# Patient Record
Sex: MALE | Race: WHITE | NOT HISPANIC OR LATINO | Employment: FULL TIME | URBAN - METROPOLITAN AREA
[De-identification: names, ages, dates, MRNs, and addresses within clinical notes are randomized per-mention and may not be internally consistent; named-entity substitution may affect disease eponyms.]

---

## 2020-10-29 ENCOUNTER — OFFICE VISIT (OUTPATIENT)
Dept: CARDIAC SURGERY | Facility: CLINIC | Age: 55
End: 2020-10-29

## 2020-10-29 VITALS
DIASTOLIC BLOOD PRESSURE: 88 MMHG | OXYGEN SATURATION: 99 % | WEIGHT: 277.2 LBS | BODY MASS INDEX: 36.74 KG/M2 | SYSTOLIC BLOOD PRESSURE: 138 MMHG | TEMPERATURE: 96.8 F | HEART RATE: 79 BPM | HEIGHT: 73 IN

## 2020-10-29 DIAGNOSIS — I73.9 PVD (PERIPHERAL VASCULAR DISEASE) WITH CLAUDICATION (HCC): Primary | ICD-10-CM

## 2020-10-29 DIAGNOSIS — L40.50 PSORIATIC ARTHRITIS (HCC): ICD-10-CM

## 2020-10-29 DIAGNOSIS — I87.2 VENOUS STASIS DERMATITIS OF RIGHT LOWER EXTREMITY: Primary | ICD-10-CM

## 2020-10-29 PROBLEM — L40.9 PSORIASIS: Status: ACTIVE | Noted: 2020-10-29

## 2020-10-29 PROCEDURE — 93923 UPR/LXTR ART STDY 3+ LVLS: CPT | Performed by: STUDENT IN AN ORGANIZED HEALTH CARE EDUCATION/TRAINING PROGRAM

## 2020-10-29 PROCEDURE — 99204 OFFICE O/P NEW MOD 45 MIN: CPT | Performed by: STUDENT IN AN ORGANIZED HEALTH CARE EDUCATION/TRAINING PROGRAM

## 2020-10-29 RX ORDER — ASPIRIN 81 MG/1
81 TABLET, CHEWABLE ORAL DAILY
COMMUNITY

## 2020-10-29 RX ORDER — APREMILAST 30 MG/1
30 TABLET, FILM COATED ORAL DAILY
COMMUNITY
Start: 2020-10-08

## 2020-10-29 RX ORDER — HYDROCHLOROTHIAZIDE 25 MG/1
25 TABLET ORAL DAILY
COMMUNITY
Start: 2020-09-05

## 2020-10-29 RX ORDER — DILTIAZEM HYDROCHLORIDE 120 MG/1
120 CAPSULE, EXTENDED RELEASE ORAL DAILY
COMMUNITY
Start: 2020-09-05

## 2021-01-08 ENCOUNTER — TELEPHONE (OUTPATIENT)
Dept: CARDIAC SURGERY | Facility: CLINIC | Age: 56
End: 2021-01-08

## 2025-03-24 ENCOUNTER — OFFICE VISIT (OUTPATIENT)
Dept: CARDIOLOGY | Facility: CLINIC | Age: 60
End: 2025-03-24
Payer: COMMERCIAL

## 2025-03-24 VITALS
WEIGHT: 296.8 LBS | HEART RATE: 59 BPM | OXYGEN SATURATION: 92 % | SYSTOLIC BLOOD PRESSURE: 107 MMHG | BODY MASS INDEX: 39.34 KG/M2 | HEIGHT: 73 IN | DIASTOLIC BLOOD PRESSURE: 62 MMHG

## 2025-03-24 DIAGNOSIS — I87.2 VENOUS STASIS DERMATITIS OF RIGHT LOWER EXTREMITY: ICD-10-CM

## 2025-03-24 DIAGNOSIS — M79.89 LEG SWELLING: ICD-10-CM

## 2025-03-24 DIAGNOSIS — I10 PRIMARY HYPERTENSION: Primary | ICD-10-CM

## 2025-03-24 PROCEDURE — 99204 OFFICE O/P NEW MOD 45 MIN: CPT | Performed by: NURSE PRACTITIONER

## 2025-03-24 RX ORDER — METOPROLOL TARTRATE 25 MG/1
25 TABLET, FILM COATED ORAL 2 TIMES DAILY
COMMUNITY

## 2025-03-24 RX ORDER — FLUTICASONE PROPIONATE 50 UG/1
2 POWDER, METERED RESPIRATORY (INHALATION) DAILY
COMMUNITY

## 2025-03-24 RX ORDER — DILTIAZEM HYDROCHLORIDE 240 MG/1
240 CAPSULE, EXTENDED RELEASE ORAL DAILY
COMMUNITY

## 2025-03-24 RX ORDER — CYCLOBENZAPRINE HCL 10 MG
10 TABLET ORAL 3 TIMES DAILY PRN
COMMUNITY

## 2025-03-24 RX ORDER — EPINEPHRINE 0.3 MG/.3ML
0.3 INJECTION SUBCUTANEOUS
COMMUNITY

## 2025-03-24 RX ORDER — TAMSULOSIN HYDROCHLORIDE 0.4 MG/1
1 CAPSULE ORAL DAILY
COMMUNITY

## 2025-03-24 RX ORDER — BUDESONIDE AND FORMOTEROL FUMARATE DIHYDRATE 160; 4.5 UG/1; UG/1
2 AEROSOL RESPIRATORY (INHALATION)
COMMUNITY

## 2025-03-24 NOTE — PROGRESS NOTES
Subjective     Manohar Nesbitt is a 59 y.o. male who presents to day for Hypertension (Blood pressure has been running high . ) and Establish Care (Patient did not bring a medication list can not tell me what he is on . They are faxing list . ).    CHIEF COMPLIANT  Chief Complaint   Patient presents with    Hypertension     Blood pressure has been running high .     Establish Care     Patient did not bring a medication list can not tell me what he is on . They are faxing list .        Active Problems:  Problem List Items Addressed This Visit          Cardiac and Vasculature    Venous stasis dermatitis of right lower extremity    Relevant Orders    Treadmill Stress Test    Adult Transthoracic Echo Complete W/ Cont if Necessary Per Protocol     Other Visit Diagnoses         Primary hypertension    -  Primary    Relevant Orders    Blood Pressure Device    Treadmill Stress Test    Adult Transthoracic Echo Complete W/ Cont if Necessary Per Protocol      Leg swelling        Relevant Orders    Treadmill Stress Test    Adult Transthoracic Echo Complete W/ Cont if Necessary Per Protocol        Problem list  1.  Hypertension  2.  Lower extremity edema  3.  Family history of heart disease  4.Obstructive sleep apnea intolerant to CPAP  5.  Diabetes mellitus  HPI  HPI  Manohar Nesbitt is a 59-year-old male patient being seen today to establish care for chronic arterial hypertension.    Patient does have a history of chronic arterial hypertension which she is on hydrochlorothiazide and diltiazem.  Today's blood pressure is 107/62 heart rate of 59.  He says he is had issues with his blood pressures for quite a while.  He says his blood pressures gotten up 276/110.  He does gets pressure in his head and flushed when he has elevated blood pressures.    Patient does have a history lower extremity edema he says he works 10 hours a day on his feet.  He has also had venous stripping in which she had a venous ulcer which is well-healed but  still has venous dermatitis.  Venous right lower extremity.    We did review his labs that showed a normal CMP and CBC.  He does report a family history of myocardial infarction and sudden cardiac death in his brother who was in his 30s.  We also reviewed his EKG that showed a normal sinus rhythm nonspecific IVCD normal axis with no acute ST changes.    Patient denies any chest pain, shortness of breath, palpitations, fatigue, syncope, PND, orthopnea, or strokelike symptoms.  PRIOR MEDS  Current Outpatient Medications on File Prior to Visit   Medication Sig Dispense Refill    aspirin 81 MG chewable tablet Chew 81 mg Daily.      Cartia  MG 24 hr capsule Take 120 mg by mouth Daily.      Esomeprazole Magnesium (NEXIUM PO) Take  by mouth.      hydroCHLOROthiazide (HYDRODIURIL) 25 MG tablet Take 25 mg by mouth Daily.      Otezla 30 MG tablet Take 30 mg by mouth Daily.       No current facility-administered medications on file prior to visit.       ALLERGIES  Lisinopril, Jose [salvia officinalis], Reglan [metoclopramide], and Pepcid [famotidine]    HISTORY  Past Medical History:   Diagnosis Date    DM (diabetes mellitus)     Borderline    Hearing loss     Hypertension     Psoriatic arthritis     Venous ulcer        Social History     Socioeconomic History    Marital status:    Tobacco Use    Smoking status: Former     Current packs/day: 0.00     Types: Cigarettes     Start date: 10/29/1994     Quit date: 10/29/1996     Years since quittin.4    Smokeless tobacco: Former     Types: Chew     Quit date: 10/29/1996   Vaping Use    Vaping status: Never Used   Substance and Sexual Activity    Alcohol use: Not Currently     Comment: beer every now and then     Drug use: Never    Sexual activity: Defer       Family History   Problem Relation Age of Onset    Cancer Mother     Cancer Father     Hypertension Father     Diabetes Father     Heart attack Brother         in        Review of Systems   Constitutional:  " Negative for chills, fatigue and fever.   HENT:  Positive for congestion (head cingestion). Negative for rhinorrhea and sore throat.    Eyes:  Negative for visual disturbance.   Respiratory:  Positive for apnea (could not tolerate CPAP). Negative for chest tightness and shortness of breath.    Cardiovascular:  Positive for leg swelling (feet swell). Negative for chest pain and palpitations.   Gastrointestinal:  Negative for constipation, diarrhea and nausea.   Musculoskeletal:  Positive for arthralgias and neck pain. Negative for back pain.   Skin:  Positive for rash (psoriasis). Negative for wound.   Allergic/Immunologic: Positive for environmental allergies and food allergies (lexy).   Neurological:  Positive for weakness (will walk and sometimes staggers). Negative for dizziness, syncope and light-headedness.   Hematological:  Bruises/bleeds easily.   Psychiatric/Behavioral:  Negative for sleep disturbance.        Objective     VITALS: /62 (BP Location: Right arm, Patient Position: Sitting, Cuff Size: Adult)   Pulse 59   Ht 185.4 cm (73\")   Wt 135 kg (296 lb 12.8 oz)   SpO2 92%   BMI 39.16 kg/m²     LABS:   Lab Results (most recent)       None            IMAGING:   No Images in the past 120 days found..    EXAM:  Physical Exam  Vitals and nursing note reviewed.   Constitutional:       Appearance: He is well-developed.   HENT:      Head: Normocephalic.   Neck:      Thyroid: No thyroid mass.      Vascular: No carotid bruit or JVD.      Trachea: Trachea and phonation normal.   Cardiovascular:      Rate and Rhythm: Normal rate and regular rhythm.      Pulses:           Radial pulses are 2+ on the right side and 2+ on the left side.        Posterior tibial pulses are 2+ on the right side and 2+ on the left side.      Heart sounds: Normal heart sounds. No murmur heard.     No friction rub. No gallop.   Pulmonary:      Effort: Pulmonary effort is normal. No respiratory distress.      Breath sounds: Normal " breath sounds. No wheezing or rales.   Musculoskeletal:         General: Normal range of motion.      Cervical back: Neck supple.      Right lower leg: Edema (1+) present.      Left lower leg: Edema (1+) present.   Skin:     General: Skin is warm and dry.      Capillary Refill: Capillary refill takes less than 2 seconds.      Findings: No rash.   Neurological:      Mental Status: He is alert and oriented to person, place, and time.   Psychiatric:         Speech: Speech normal.         Behavior: Behavior normal.         Thought Content: Thought content normal.         Judgment: Judgment normal.         Procedure   Procedures       Assessment & Plan    Diagnosis Plan   1. Primary hypertension  Blood Pressure Device    Treadmill Stress Test    Adult Transthoracic Echo Complete W/ Cont if Necessary Per Protocol      2. Venous stasis dermatitis of right lower extremity  Treadmill Stress Test    Adult Transthoracic Echo Complete W/ Cont if Necessary Per Protocol      3. Leg swelling  Treadmill Stress Test    Adult Transthoracic Echo Complete W/ Cont if Necessary Per Protocol      1.  Patient's blood pressure is controlled on current blood pressure medication regimen.  No medication changes are warranted at this time.  Patient advised to monitor blood pressure on a daily basis and report any persistent highs or lows.  Set goal blood pressure for patient at 130/80 or below.  Patient was given an order for blood pressure monitoring device.  2.  Patient does have bilateral lower extremity edema with the right being worse than the left.  He does have a history of venous stripping with a venous ulcer.  He does have venous dermatitis to his right lower extremity with no openings.  We will get an echocardiogram to look for hypertensive heart disease as well as heart failure as a contributing factor.  3.  Due to patient's family history of sudden cardiac death and MI in his brother in his 30s we will do a screening treadmill  stress test for further evaluation of ischemia.  4.  Informed of signs and symptoms of ACS and advised to seek emergent treatment for any new worsening symptoms.  Patient also advised sooner follow-up as needed.  Also advised to follow-up with family doctor as needed  This note is dictated utilizing voice recognition software.  Although this record has been proof read, transcriptional errors may still be present. If questions occur regarding the content of this record please do not hesitate to call our office.  I have reviewed and confirmed the accuracy of the ROS as documented by the MA/LPN/RN ANDREA Tapia    No follow-ups on file.    Diagnoses and all orders for this visit:    1. Primary hypertension (Primary)  -     Blood Pressure Device  -     Treadmill Stress Test; Future  -     Adult Transthoracic Echo Complete W/ Cont if Necessary Per Protocol; Future    2. Venous stasis dermatitis of right lower extremity  -     Treadmill Stress Test; Future  -     Adult Transthoracic Echo Complete W/ Cont if Necessary Per Protocol; Future    3. Leg swelling  -     Treadmill Stress Test; Future  -     Adult Transthoracic Echo Complete W/ Cont if Necessary Per Protocol; Future        Manohar Nesbitt  reports that he quit smoking about 28 years ago. His smoking use included cigarettes. He started smoking about 30 years ago. He quit smokeless tobacco use about 28 years ago.  His smokeless tobacco use included chew. I have educated him on the risk of diseases from using tobacco products. Patient does not smoke.          Class 2 Severe Obesity (BMI >=35 and <=39.9). Obesity-related health conditions include the following: obstructive sleep apnea and hypertension. We discussed portion control and increasing exercise.             MEDS ORDERED DURING VISIT:  No orders of the defined types were placed in this encounter.          This document has been electronically signed by ANDREA Tapia Jr.  March 24, 2025 10:27  EDT

## 2025-03-26 ENCOUNTER — PATIENT ROUNDING (BHMG ONLY) (OUTPATIENT)
Dept: CARDIOLOGY | Facility: CLINIC | Age: 60
End: 2025-03-26
Payer: COMMERCIAL

## 2025-03-26 NOTE — PROGRESS NOTES
March 26, 2025    Hello, may I speak with Manohar Nesbitt?    My name is SELAM LANDAVERDE      I am  with [facility]  [address] [place] [id number] [phone].    Before we get started may I verify your date of birth? 1965    I am calling to officially welcome you to our practice and ask about your recent visit. Is this a good time to talk? yes    Tell me about your visit with us. What things went well?  HE ENJOYED, IT WAS FINE, ADDRESSED ALL THE CONCERNS       We're always looking for ways to make our patients' experiences even better. Do you have recommendations on ways we may improve?  no.  IT SEEMED TO ALL TO PRETTY WELL    Overall were you satisfied with your first visit to our practice? yes       I appreciate you taking the time to speak with me today. Is there anything else I can do for you? No.        Thank you, and have a great day.

## 2025-08-25 ENCOUNTER — TELEPHONE (OUTPATIENT)
Dept: CARDIOLOGY | Facility: CLINIC | Age: 60
End: 2025-08-25
Payer: COMMERCIAL